# Patient Record
Sex: FEMALE | Race: WHITE | NOT HISPANIC OR LATINO | URBAN - METROPOLITAN AREA
[De-identification: names, ages, dates, MRNs, and addresses within clinical notes are randomized per-mention and may not be internally consistent; named-entity substitution may affect disease eponyms.]

---

## 2019-06-28 ENCOUNTER — OFFICE VISIT CONVERTED (OUTPATIENT)
Dept: ORTHOPEDIC SURGERY | Facility: CLINIC | Age: 24
End: 2019-06-28
Attending: ORTHOPAEDIC SURGERY

## 2019-07-30 ENCOUNTER — OFFICE VISIT CONVERTED (OUTPATIENT)
Dept: ORTHOPEDIC SURGERY | Facility: CLINIC | Age: 24
End: 2019-07-30
Attending: ORTHOPAEDIC SURGERY

## 2019-09-03 ENCOUNTER — OFFICE VISIT CONVERTED (OUTPATIENT)
Dept: ORTHOPEDIC SURGERY | Facility: CLINIC | Age: 24
End: 2019-09-03
Attending: ORTHOPAEDIC SURGERY

## 2019-09-17 ENCOUNTER — HOSPITAL ENCOUNTER (OUTPATIENT)
Dept: GENERAL RADIOLOGY | Facility: HOSPITAL | Age: 24
Discharge: HOME | End: 2019-09-17
Attending: ORTHOPAEDIC SURGERY

## 2019-10-17 ENCOUNTER — OFFICE VISIT CONVERTED (OUTPATIENT)
Dept: ORTHOPEDIC SURGERY | Facility: CLINIC | Age: 24
End: 2019-10-17
Attending: ORTHOPAEDIC SURGERY

## 2019-12-17 ENCOUNTER — CONVERSION ENCOUNTER (OUTPATIENT)
Dept: ORTHOPEDIC SURGERY | Facility: CLINIC | Age: 24
End: 2019-12-17

## 2019-12-17 ENCOUNTER — OFFICE VISIT CONVERTED (OUTPATIENT)
Dept: ORTHOPEDIC SURGERY | Facility: CLINIC | Age: 24
End: 2019-12-17
Attending: ORTHOPAEDIC SURGERY

## 2021-05-15 VITALS — HEART RATE: 110 BPM | OXYGEN SATURATION: 94 % | HEIGHT: 67 IN

## 2021-05-15 VITALS — BODY MASS INDEX: 47.09 KG/M2 | HEART RATE: 104 BPM | WEIGHT: 293 LBS | HEIGHT: 66 IN | OXYGEN SATURATION: 97 %

## 2021-05-15 VITALS — OXYGEN SATURATION: 98 % | HEIGHT: 67 IN | HEART RATE: 103 BPM

## 2021-05-15 VITALS — OXYGEN SATURATION: 97 % | HEART RATE: 130 BPM | HEIGHT: 67 IN

## 2021-05-15 VITALS — HEART RATE: 103 BPM | HEIGHT: 66 IN | OXYGEN SATURATION: 98 %

## 2021-12-15 ENCOUNTER — TRANSCRIBE ORDERS (OUTPATIENT)
Dept: ADMINISTRATIVE | Facility: HOSPITAL | Age: 26
End: 2021-12-15

## 2021-12-15 DIAGNOSIS — E03.9 HYPOTHYROIDISM, UNSPECIFIED TYPE: Primary | ICD-10-CM

## 2021-12-15 DIAGNOSIS — E28.2 POLYCYSTIC OVARIAN SYNDROME: ICD-10-CM

## 2022-01-11 ENCOUNTER — HOSPITAL ENCOUNTER (OUTPATIENT)
Dept: ULTRASOUND IMAGING | Facility: HOSPITAL | Age: 27
Discharge: HOME | End: 2022-01-11

## 2022-01-11 DIAGNOSIS — E03.9 HYPOTHYROIDISM, UNSPECIFIED TYPE: ICD-10-CM

## 2022-01-11 DIAGNOSIS — E28.2 POLYCYSTIC OVARIAN SYNDROME: ICD-10-CM

## 2022-01-11 PROCEDURE — 76536 US EXAM OF HEAD AND NECK: CPT

## 2022-01-11 PROCEDURE — 76830 TRANSVAGINAL US NON-OB: CPT

## 2024-02-02 PROBLEM — R07.2 CHEST PAIN, PRECORDIAL: Status: ACTIVE | Noted: 2024-02-02

## 2024-03-11 NOTE — PROGRESS NOTES
"Chief Complaint  Establish Care and Chest Pain      History of Present Illness  Alexandra Mark presents to [facility] CARDIOLOGY  Patient is a 28-year-old female with a past history of some borderline hypertension recently who has been having intermittent chest discomfort which is various locations on her chest can last for a few minutes associated with anxiety or stressful emotional situations.  Can occur weekly sharp in nature she is not having exertional chest pain.      History reviewed. No pertinent past medical history.    No current outpatient medications on file.    There are no discontinued medications.  No Known Allergies     Social History     Tobacco Use    Smoking status: Never    Smokeless tobacco: Never       History reviewed. No pertinent family history.     Objective     /63   Pulse 105   Ht 167.6 cm (66\")   Wt (!) 139 kg (307 lb)   BMI 49.55 kg/m²       Physical Exam    General Appearance:   no acute distress  Alert and oriented x3  HENT:   lips not cyanotic  Atraumatic  Neck:  No jvd   supple  Respiratory:  no respiratory distress  normal breath sounds  no rales  Cardiovascular:  Regular rate and rhythm  no S3, no S4   no murmur  no rub  Extremities  No cyanosis  lower extremity edema: none    Skin:   warm, dry  No rashes    Result Review :     No results found for: \"PROBNP\"  Thyroid stimulating hormone (TSH)  Specimen:  Plasma - Plasma specimen (specimen)  Component   Ref Range & Units 1 yr ago   TSH-TL   0.35 - 4.80 uIU/mL 2.34    Resulting Agency [facility]   Specimen Collected: 02/22/23 15:17    Performed by: [facility] LAB Last Resulted: 02/22/23 16:26   Received From: [facility]  Result Received: 02/19/24 13:11       Component   Ref Range & Units 1 yr ago Comments   GLUCOSE-TL   70 - 110 mg/dL 129 High      BUN-TL   7 - 18 mg/dL 16     CREATININE-TL   0.6 - 1.0 mg/dL 0.8     SODIUM-TL   136 - 145 mmol/L 139  " "   POTASSIUM-TL   3.5 - 5.1 mmol/L 4.1     Chloride   98 - 107 mmol/L 108 High      CARBON DIOXIDE-TL   21 - 32 mmol/L 22     CALCIUM-TL   8.7 - 10.2 mg/dL 8.8     ANION GAP-TL   4 - 12 mmol/L 13 High      GFR ESTIMATE-TL   mL/min 102      GFR   WITH KIDNEY DAMAGE     W/O DAMAGE   >=90         STAGE 1            NORMAL   60-89        STAGE 2            DEC GFR   30-59        STAGE 3            STAGE 3   15-29        STAGE 4            STAGE 4   <15          STAGE 5            STAGE 5       The formula is valid only for adults between age 18 and 70.   CALCULATED OSMO-TL   275 - 305 271 Low      Resulting [facility]         men:  Plasma - Plasma specimen (specimen)  Component   Ref Range & Units 11 mo ago   TSH-TL   0.35 - 4.80 uIU/mL 2.34    Resulting [facility]   Specimen Collected: 02/22/23 15:17       Component   Ref Range & Units 11 mo ago Comments   GLUCOSE-TL   70 - 110 mg/dL 129 High      BUN-TL   7 - 18 mg/dL 16     CREATININE-TL   0.6 - 1.0 mg/dL 0.8     SODIUM-TL   136 - 145 mmol/L 139     POTASSIUM-TL   3.5 - 5.1 mmol/L 4.1     Chloride   98 - 107 mmol/L 108 High      CARBON DIOXIDE-TL   21 - 32 mmol/L 22     CALCIUM-TL   8.7 - 10.2 mg/dL 8.8     ANION GAP-TL   4 - 12 mmol/L 13 High      GFR ESTIMATE-TL   mL/min 102               No results found for: \"TSH\"   No results found for: \"FREET4\"   No results found for: \"DDIMERQUANT\"  No results found for: \"MG\"   No results found for: \"DIGOXIN\"   No results found for: \"TROPONINT\"   No results found for: \"POCTROP\"(                No results found for this or any previous visit.             The ASCVD Risk score (Norma DK, et al., 2019) failed to calculate for the following reasons:    The 2019 ASCVD risk score is only valid for ages 40 to 79     Diagnoses and all orders for this visit:    1. Chest pain, atypical (Primary)  Assessment & Plan:  Patient with atypical chest pain issues and low cardiovascular risk score favor muscle " skeletal/anxiety etiology for her symptoms would not recommend further cardiovascular workup with stress test at this point did  patient on lifestyle changes with close monitoring of her blood pressure as it appears to be within the borderline hypertensive range right now.  Also did  patient on low-sodium diet, exercising, and a heart healthy diet.  Will go ahead and check a baseline lipid panel as well    Orders:  -     Lipid Panel; Future    Other orders  -     Cancel: Lipid Panel; Future            Follow Up     No follow-ups on file.          Patient was given instructions and counseling regarding her condition or for health maintenance advice. Please see specific information pulled into the AVS if appropriate.

## 2024-03-18 ENCOUNTER — OFFICE VISIT (OUTPATIENT)
Dept: CARDIOLOGY | Facility: CLINIC | Age: 29
End: 2024-03-18
Payer: COMMERCIAL

## 2024-03-18 VITALS
BODY MASS INDEX: 47.09 KG/M2 | HEIGHT: 66 IN | HEART RATE: 105 BPM | WEIGHT: 293 LBS | SYSTOLIC BLOOD PRESSURE: 144 MMHG | DIASTOLIC BLOOD PRESSURE: 63 MMHG

## 2024-03-18 DIAGNOSIS — R07.89 CHEST PAIN, ATYPICAL: Primary | ICD-10-CM

## 2024-03-18 PROCEDURE — 99203 OFFICE O/P NEW LOW 30 MIN: CPT | Performed by: INTERNAL MEDICINE

## 2024-03-18 NOTE — ASSESSMENT & PLAN NOTE
Patient with atypical chest pain issues and low cardiovascular risk score favor muscle skeletal/anxiety etiology for her symptoms would not recommend further cardiovascular workup with stress test at this point did  patient on lifestyle changes with close monitoring of her blood pressure as it appears to be within the borderline hypertensive range right now.  Also did  patient on low-sodium diet, exercising, and a heart healthy diet.  Will go ahead and check a baseline lipid panel as well

## 2024-11-26 ENCOUNTER — HOSPITAL ENCOUNTER (EMERGENCY)
Facility: HOSPITAL | Age: 29
Discharge: HOME | End: 2024-11-26
Attending: EMERGENCY MEDICINE | Admitting: EMERGENCY MEDICINE
Payer: COMMERCIAL

## 2024-11-26 ENCOUNTER — APPOINTMENT (OUTPATIENT)
Dept: GENERAL RADIOLOGY | Facility: HOSPITAL | Age: 29
End: 2024-11-26
Payer: COMMERCIAL

## 2024-11-26 VITALS
RESPIRATION RATE: 18 BRPM | SYSTOLIC BLOOD PRESSURE: 102 MMHG | TEMPERATURE: 98.3 F | BODY MASS INDEX: 47.09 KG/M2 | OXYGEN SATURATION: 98 % | WEIGHT: 293 LBS | HEART RATE: 93 BPM | DIASTOLIC BLOOD PRESSURE: 56 MMHG | HEIGHT: 66 IN

## 2024-11-26 DIAGNOSIS — R07.9 NONSPECIFIC CHEST PAIN: Primary | ICD-10-CM

## 2024-11-26 DIAGNOSIS — M94.0 COSTOCHONDRITIS: ICD-10-CM

## 2024-11-26 LAB
ALBUMIN SERPL-MCNC: 4.2 G/DL (ref 3.5–5.2)
ALBUMIN/GLOB SERPL: 1.2 G/DL
ALP SERPL-CCNC: 81 U/L (ref 39–117)
ALT SERPL W P-5'-P-CCNC: 32 U/L (ref 1–33)
ANION GAP SERPL CALCULATED.3IONS-SCNC: 7.8 MMOL/L (ref 5–15)
AST SERPL-CCNC: 20 U/L (ref 1–32)
BASOPHILS # BLD AUTO: 0.11 10*3/MM3 (ref 0–0.2)
BASOPHILS NFR BLD AUTO: 1.3 % (ref 0–1.5)
BILIRUB SERPL-MCNC: 0.5 MG/DL (ref 0–1.2)
BUN SERPL-MCNC: 15 MG/DL (ref 6–20)
BUN/CREAT SERPL: 16.9 (ref 7–25)
CALCIUM SPEC-SCNC: 9.3 MG/DL (ref 8.6–10.5)
CHLORIDE SERPL-SCNC: 105 MMOL/L (ref 98–107)
CO2 SERPL-SCNC: 25.2 MMOL/L (ref 22–29)
CREAT SERPL-MCNC: 0.89 MG/DL (ref 0.57–1)
DEPRECATED RDW RBC AUTO: 41.1 FL (ref 37–54)
EGFRCR SERPLBLD CKD-EPI 2021: 89.6 ML/MIN/1.73
EOSINOPHIL # BLD AUTO: 0.24 10*3/MM3 (ref 0–0.4)
EOSINOPHIL NFR BLD AUTO: 2.8 % (ref 0.3–6.2)
ERYTHROCYTE [DISTWIDTH] IN BLOOD BY AUTOMATED COUNT: 12.9 % (ref 12.3–15.4)
GLOBULIN UR ELPH-MCNC: 3.4 GM/DL
GLUCOSE SERPL-MCNC: 97 MG/DL (ref 65–99)
HCT VFR BLD AUTO: 42.9 % (ref 34–46.6)
HGB BLD-MCNC: 14.8 G/DL (ref 12–15.9)
HOLD SPECIMEN: NORMAL
HOLD SPECIMEN: NORMAL
IMM GRANULOCYTES # BLD AUTO: 0.06 10*3/MM3 (ref 0–0.05)
IMM GRANULOCYTES NFR BLD AUTO: 0.7 % (ref 0–0.5)
LIPASE SERPL-CCNC: 27 U/L (ref 13–60)
LYMPHOCYTES # BLD AUTO: 3.27 10*3/MM3 (ref 0.7–3.1)
LYMPHOCYTES NFR BLD AUTO: 37.7 % (ref 19.6–45.3)
MAGNESIUM SERPL-MCNC: 2.1 MG/DL (ref 1.6–2.6)
MCH RBC QN AUTO: 30.3 PG (ref 26.6–33)
MCHC RBC AUTO-ENTMCNC: 34.5 G/DL (ref 31.5–35.7)
MCV RBC AUTO: 87.9 FL (ref 79–97)
MONOCYTES # BLD AUTO: 0.55 10*3/MM3 (ref 0.1–0.9)
MONOCYTES NFR BLD AUTO: 6.3 % (ref 5–12)
NEUTROPHILS NFR BLD AUTO: 4.44 10*3/MM3 (ref 1.7–7)
NEUTROPHILS NFR BLD AUTO: 51.2 % (ref 42.7–76)
NRBC BLD AUTO-RTO: 0 /100 WBC (ref 0–0.2)
NT-PROBNP SERPL-MCNC: 53.5 PG/ML (ref 0–450)
PLATELET # BLD AUTO: 295 10*3/MM3 (ref 140–450)
PMV BLD AUTO: 10.4 FL (ref 6–12)
POTASSIUM SERPL-SCNC: 4.2 MMOL/L (ref 3.5–5.2)
PROT SERPL-MCNC: 7.6 G/DL (ref 6–8.5)
QT INTERVAL: 353 MS
QTC INTERVAL: 457 MS
RBC # BLD AUTO: 4.88 10*6/MM3 (ref 3.77–5.28)
SODIUM SERPL-SCNC: 138 MMOL/L (ref 136–145)
TROPONIN T SERPL HS-MCNC: <6 NG/L
WBC NRBC COR # BLD AUTO: 8.67 10*3/MM3 (ref 3.4–10.8)
WHOLE BLOOD HOLD COAG: NORMAL
WHOLE BLOOD HOLD SPECIMEN: NORMAL

## 2024-11-26 PROCEDURE — 84484 ASSAY OF TROPONIN QUANT: CPT | Performed by: EMERGENCY MEDICINE

## 2024-11-26 PROCEDURE — 93005 ELECTROCARDIOGRAM TRACING: CPT | Performed by: EMERGENCY MEDICINE

## 2024-11-26 PROCEDURE — 80053 COMPREHEN METABOLIC PANEL: CPT | Performed by: EMERGENCY MEDICINE

## 2024-11-26 PROCEDURE — 83690 ASSAY OF LIPASE: CPT | Performed by: EMERGENCY MEDICINE

## 2024-11-26 PROCEDURE — 83880 ASSAY OF NATRIURETIC PEPTIDE: CPT | Performed by: EMERGENCY MEDICINE

## 2024-11-26 PROCEDURE — 71045 X-RAY EXAM CHEST 1 VIEW: CPT

## 2024-11-26 PROCEDURE — 99284 EMERGENCY DEPT VISIT MOD MDM: CPT

## 2024-11-26 PROCEDURE — 85025 COMPLETE CBC W/AUTO DIFF WBC: CPT | Performed by: EMERGENCY MEDICINE

## 2024-11-26 PROCEDURE — 83735 ASSAY OF MAGNESIUM: CPT | Performed by: EMERGENCY MEDICINE

## 2024-11-26 RX ORDER — SODIUM CHLORIDE 0.9 % (FLUSH) 0.9 %
10 SYRINGE (ML) INJECTION AS NEEDED
Status: DISCONTINUED | OUTPATIENT
Start: 2024-11-26 | End: 2024-11-26 | Stop reason: HOSPADM

## 2024-11-26 RX ORDER — ASPIRIN 81 MG/1
324 TABLET, CHEWABLE ORAL ONCE
Status: DISCONTINUED | OUTPATIENT
Start: 2024-11-26 | End: 2024-11-26

## 2024-11-26 RX ORDER — HYDROXYZINE HYDROCHLORIDE 25 MG/1
25 TABLET, FILM COATED ORAL 3 TIMES DAILY PRN
COMMUNITY

## 2024-11-26 NOTE — ED PROVIDER NOTES
Time: 10:24 AM EST  Date of encounter:  11/26/2024  Independent Historian/Clinical History and Information was obtained by:   Patient and Family    History is limited by: N/A    Chief Complaint: Chest pain      History of Present Illness:  Patient is a 28 y.o. year old female who presents to the emergency department for evaluation of chest pain.  Patient presents emergency department today for evaluation of chest pain and shortness of breath.  She states yesterday she was having a bowel movement and had onset of lower abdominal pain and then had near syncopal episode.  She states he had been having midsternal chest pain since then.  She went to [facility] would like to emergency department yesterday and had workup and was sent home with Atarax and ibuprofen.  She states today she is having continued midsternal chest pain that is worse with movement and deep breathing.  She states yesterday when the near-syncopal episode occurred that she did not have any fall or injury to her chest.  Patient denies any personal history of cardiac issues but does admit to family history on her side.  Patient states that she has had some readings of elevated blood pressure but is not diagnosed with hypertension or on any medications for hypertension.  Patient states the pain does not radiate from the midsternal area.  She does states she has had 1 episode of numbness of the 3rd through 4th digits on her left hand.      Patient Care Team  Primary Care Provider: Sonia Hinojosa APRN    Past Medical History:     No Known Allergies  History reviewed. No pertinent past medical history.  History reviewed. No pertinent surgical history.  History reviewed. No pertinent family history.    Home Medications:  Prior to Admission medications    Medication Sig Start Date End Date Taking? Authorizing Provider   hydrOXYzine (ATARAX) 25 MG tablet Take 1 tablet by mouth 3 (Three) Times a Day As Needed for Itching.    Provider, MD Darinel  "       Social History:   Social History     Tobacco Use    Smoking status: Never    Smokeless tobacco: Never         Review of Systems:  Review of Systems   Respiratory:  Positive for shortness of breath.    Cardiovascular:  Positive for chest pain.   Neurological:  Positive for numbness.        Physical Exam:  /97 (BP Location: Left arm, Patient Position: Lying)   Pulse 101   Temp 98.3 °F (36.8 °C) (Oral)   Resp 17   Ht 167.6 cm (66\")   Wt (!) 144 kg (317 lb 0.3 oz)   LMP 11/11/2024 (Exact Date)   SpO2 98%   BMI 51.17 kg/m²     Physical Exam  Vitals and nursing note reviewed.   Constitutional:       General: She is not in acute distress.     Appearance: Normal appearance. She is well-developed. She is obese. She is not ill-appearing, toxic-appearing or diaphoretic.   HENT:      Head: Normocephalic and atraumatic.      Nose: Nose normal.   Eyes:      Extraocular Movements: Extraocular movements intact.      Conjunctiva/sclera: Conjunctivae normal.      Pupils: Pupils are equal, round, and reactive to light.   Cardiovascular:      Rate and Rhythm: Normal rate and regular rhythm.      Heart sounds: Normal heart sounds.   Pulmonary:      Effort: Pulmonary effort is normal.      Breath sounds: Normal breath sounds.   Chest:      Chest wall: Tenderness present.       Abdominal:      General: Abdomen is flat.      Palpations: Abdomen is soft. There is no mass.      Tenderness: There is no abdominal tenderness.   Musculoskeletal:         General: Normal range of motion.      Cervical back: Normal range of motion and neck supple.   Skin:     General: Skin is warm and dry.   Neurological:      General: No focal deficit present.      Mental Status: She is alert and oriented to person, place, and time.   Psychiatric:         Mood and Affect: Mood normal.         Behavior: Behavior normal.         Thought Content: Thought content normal.         Judgment: Judgment normal.          "       Procedures:  Procedures      Medical Decision Making:    Comorbidities that affect care:    None    External Notes reviewed:    Previous ED Note: Emergency department visit from yesterday where patient was seen for chest pain at [facility] emergency department      The following orders were placed and all results were independently analyzed by me:  Orders Placed This Encounter   Procedures    XR Chest 1 View    Allred Draw    High Sensitivity Troponin T    Comprehensive Metabolic Panel    Lipase    BNP    Magnesium    CBC Auto Differential    High Sensitivity Troponin T 2Hr    Ambulatory Referral to Cardiology    NPO Diet NPO Type: Strict NPO    Undress & Gown    Continuous Pulse Oximetry    Oxygen Therapy- Nasal Cannula; Titrate 1-6 LPM Per SpO2; 90 - 95%    ECG 12 Lead ED Triage Standing Order; Chest Pain    ECG 12 Lead ED Triage Standing Order; Chest Pain    Insert Peripheral IV    CBC & Differential    Green Top (Gel)    Lavender Top    Gold Top - SST    Light Blue Top       Medications Given in the Emergency Department:  Medications   sodium chloride 0.9 % flush 10 mL (has no administration in time range)        ED Course:         Labs:    Lab Results (last 24 hours)       Procedure Component Value Units Date/Time    CONV TROPONIN I [[id number]] Collected: 11/25/24 1441     Updated: 11/26/24 0957    PROBNP (REFERENCE) [[id number]] Collected: 11/25/24 1441     Updated: 11/26/24 0957    MAGNESIUM [[id number]] Collected: 11/25/24 1441     Updated: 11/26/24 0957    CK [[id number]] Collected: 11/25/24 1441     Updated: 11/26/24 0957    LIPASE [[id number]] Collected: 11/25/24 1441     Updated: 11/26/24 0957    PROTIME-INR [[id number]] Collected: 11/25/24 1441     Updated: 11/26/24 0957    COMPREHENSIVE METABOLIC PANEL [[id number]]  (Abnormal) Collected: 11/25/24 1441     Updated: 11/26/24 0957    CBC AND DIFFERENTIAL [[id number]]  (Abnormal) Collected: 11/25/24 1441     Updated: 11/26/24 0957     Urinalysis, Microscopic Only - [[id number]]  (Abnormal) Collected: 11/25/24 1543     Updated: 11/26/24 0957    Urinalysis With Culture If Indicated - [[id number]]  (Abnormal) Collected: 11/25/24 1543     Updated: 11/26/24 0957    Pregnancy, Urine - [[id number]] Collected: 11/25/24 1543     Updated: 11/26/24 0957    High Sensitivity Troponin T [[id number]]  (Normal) Collected: 11/26/24 1018    Specimen: Blood from Arm, Right Updated: 11/26/24 1054     HS Troponin T <6 ng/L     Narrative:      High Sensitive Troponin T Reference Range:  <14.0 ng/L- Negative Female for AMI  <22.0 ng/L- Negative Male for AMI  >=14 - Abnormal Female indicating possible myocardial injury.  >=22 - Abnormal Male indicating possible myocardial injury.   Clinicians would have to utilize clinical acumen, EKG, Troponin, and serial changes to determine if it is an Acute Myocardial Infarction or myocardial injury due to an underlying chronic condition.         CBC & Differential [[id number]]  (Abnormal) Collected: 11/26/24 1018    Specimen: Blood from Arm, Right Updated: 11/26/24 1034    Narrative:      The following orders were created for panel order CBC & Differential.  Procedure                               Abnormality         Status                     ---------                               -----------         ------                     CBC Auto Differential[[id number]]        Abnormal            Final result                 Please view results for these tests on the individual orders.    Comprehensive Metabolic Panel [[id number]] Collected: 11/26/24 1018    Specimen: Blood from Arm, Right Updated: 11/26/24 1050     Glucose 97 mg/dL      BUN 15 mg/dL      Creatinine 0.89 mg/dL      Sodium 138 mmol/L      Potassium 4.2 mmol/L      Chloride 105 mmol/L      CO2 25.2 mmol/L      Calcium 9.3 mg/dL      Total Protein 7.6 g/dL      Albumin 4.2 g/dL      ALT (SGPT) 32 U/L      AST (SGOT) 20 U/L      Alkaline Phosphatase 81 U/L      Total Bilirubin 0.5  mg/dL      Globulin 3.4 gm/dL      A/G Ratio 1.2 g/dL      BUN/Creatinine Ratio 16.9     Anion Gap 7.8 mmol/L      eGFR 89.6 mL/min/1.73     Narrative:      GFR Normal >60  Chronic Kidney Disease <60  Kidney Failure <15      Lipase [[id number]]  (Normal) Collected: 11/26/24 1018    Specimen: Blood from Arm, Right Updated: 11/26/24 1050     Lipase 27 U/L     BNP [[id number]]  (Normal) Collected: 11/26/24 1018    Specimen: Blood from Arm, Right Updated: 11/26/24 1054     proBNP 53.5 pg/mL     Narrative:      This assay is used as an aid in the diagnosis of individuals suspected of having heart failure. It can be used as an aid in the diagnosis of acute decompensated heart failure (ADHF) in patients presenting with signs and symptoms of ADHF to the emergency department (ED). In addition, NT-proBNP of <300 pg/mL indicates ADHF is not likely.    Age Range Result Interpretation  NT-proBNP Concentration (pg/mL:      <50             Positive            >450                   Gray                 300-450                    Negative             <300    50-75           Positive            >900                  Gray                300-900                  Negative            <300      >75             Positive            >1800                  Gray                300-1800                  Negative            <300    Magnesium [[id number]]  (Normal) Collected: 11/26/24 1018    Specimen: Blood from Arm, Right Updated: 11/26/24 1050     Magnesium 2.1 mg/dL     CBC Auto Differential [[id number]]  (Abnormal) Collected: 11/26/24 1018    Specimen: Blood from Arm, Right Updated: 11/26/24 1034     WBC 8.67 10*3/mm3      RBC 4.88 10*6/mm3      Hemoglobin 14.8 g/dL      Hematocrit 42.9 %      MCV 87.9 fL      MCH 30.3 pg      MCHC 34.5 g/dL      RDW 12.9 %      RDW-SD 41.1 fl      MPV 10.4 fL      Platelets 295 10*3/mm3      Neutrophil % 51.2 %      Lymphocyte % 37.7 %      Monocyte % 6.3 %      Eosinophil % 2.8 %      Basophil % 1.3 %       Immature Grans % 0.7 %      Neutrophils, Absolute 4.44 10*3/mm3      Lymphocytes, Absolute 3.27 10*3/mm3      Monocytes, Absolute 0.55 10*3/mm3      Eosinophils, Absolute 0.24 10*3/mm3      Basophils, Absolute 0.11 10*3/mm3      Immature Grans, Absolute 0.06 10*3/mm3      nRBC 0.0 /100 WBC              Imaging:    XR Chest 1 View    Result Date: 11/26/2024  XR CHEST 1 VW Date of Exam: 11/26/2024 10:15 AM EST Indication: Chest Pain Triage Protocol Comparison: Chest radiograph dated 4/16/2015 Findings: The cardiomediastinal silhouette is within normal limits. Pulmonary vascularity appears normal. There is no focal airspace consolidation, pleural effusion, or pneumothorax. There are degenerative changes of the thoracic spine.     Impression: 1. No acute cardiopulmonary abnormality. Electronically Signed: Denton Sadiq  11/26/2024 10:36 AM EST  Workstation ID: [id number]    XR Chest 2 View    Result Date: 11/25/2024  REPORT-ID:[id number] STUDY:   X-RAY CHEST REASON FOR EXAM:   Female, 28 years old.  CHEST PAIN; With deep breath or movement of arms.  Pt had near-syncopal episode today while having BM TECHNIQUE:   PA and lateral COMPARISON:   None. ___________________________________ FINDINGS: The lungs are clear and expanded.  There is no demonstrated pleural abnormality. Normal size heart.   Normal mediastinum and milton.  Normal visualized pulmonary arteries.  Normal visualized aortic arch and descending thoracic aorta. Normal visualized thoracic spine.  Normal visualized ribs, clavicles, and shoulders. There is no demonstrated abnormality of the visualized soft tissue structures of the upper abdomen. ___________________________________    Normal x-ray examination of the chest. Electronically Signed: Haider Coto MD 2024/11/25 at 15:33 CST Reading Location ID and State: [address] / [place] Tel [phone], Service support  [phone], Fax [phone]       Differential Diagnosis and  Discussion:    Chest Pain:  Based on the patient's signs and symptoms, I considered aortic dissection, myocardial infaction, pulmonary embolism, cardiac tamponade, pericarditis, pneumothorax, musculoskeletal chest pain and other differential diagnosis as an etiology of the patient's chest pain.     All labs were reviewed and interpreted by me.  All X-rays impressions were independently interpreted by me.  EKG was interpreted by me.  EKG was interpreted by supervising attending.    MDM  Number of Diagnoses or Management Options  Diagnosis management comments: Patient presented to emergency department today for evaluation of chest pain that is ongoing for 2 days.  Patient was seen at [place] emergency department yesterday and had negative workup but continued to have pain today so came here for evaluation.  CBC notes normal white blood cell count hemoglobin Bakkar.  CMP unremarkable.  Lipase normal.  Magnesium normal.  BNP normal.  Troponin negative.  EKG noted sinus rhythm.  Chest x-ray had no acute findings.  Heart score of 1.  Patient's pain is reproducible so I believe this is noncardiac chest pain like related to pleurisy or costochondritis.  I did discuss with patient that we will go ahead and refer her to cardiology for evaluation due to her family history.  Will have patient continue ibuprofen at home.       Amount and/or Complexity of Data Reviewed  Clinical lab tests: reviewed and ordered  Tests in the radiology section of CPT®: reviewed and ordered    Risk of Complications, Morbidity, and/or Mortality  Presenting problems: moderate  Diagnostic procedures: low  Management options: low    Patient Progress  Patient progress: stable       Patient Care Considerations:    CT CHEST: I considered ordering a CT scan of the chest, however patient is not hypoxic      Consultants/Shared Management Plan:        Social Determinants of Health:    Patient is independent, reliable, and has access to care.        Disposition and Care Coordination:    Discharged: The patient is suitable and stable for discharge with no need for consideration of admission.    I have explained the patient´s condition, diagnoses and treatment plan based on the information available to me at this time. I have answered questions and addressed any concerns. The patient has a good  understanding of the patient´s diagnosis, condition, and treatment plan as can be expected at this point. The vital signs have been stable. The patient´s condition is stable and appropriate for discharge from the emergency department.      The patient will pursue further outpatient evaluation with the primary care physician or other designated or consulting physician as outlined in the discharge instructions. They are agreeable to this plan of care and follow-up instructions have been explained in detail. The patient has received these instructions in written format and has expressed an understanding of the discharge instructions. The patient is aware that any significant change in condition or worsening of symptoms should prompt an immediate return to this or the closest emergency department or call to 911.  I have explained discharge medications and the need for follow up with the patient/caretakers. This was also printed in the discharge instructions. Patient was discharged with the following medications and follow up:      Medication List      No changes were made to your prescriptions during this visit.      Sonia Hinojosa, APRN  [address] [facility] [address]  [phone]             Final diagnoses:   Nonspecific chest pain   Costochondritis        ED Disposition       ED Disposition   Discharge    Condition   Stable    Comment   --               This medical record created using voice recognition software.             Santiago Iraheta PA-C  11/26/24 2590

## 2024-11-26 NOTE — DISCHARGE INSTRUCTIONS
Your labs and EKG completed in the emergency department today look well.  I sent referral to cardiology and they should contact you for follow-up appointment.  I believe your symptoms are related to costochondritis.  Continue taking ibuprofen as directed.  Return to the emergency department for new or worsening symptoms.

## 2024-11-26 NOTE — Clinical Note
[facility] EMERGENCY ROOM  [address] [place] [address]  Phone: [phone]  Fax: [phone]    Zachery accompanied Alexandra Mark to the emergency department on 11/26/2024. They may return to work on 11/27/2024.        Thank you for choosing [facility].    Magdalena Lewis RN

## 2024-11-26 NOTE — ED TRIAGE NOTES
Patient to ED from home with SOB, Chest Pain that started yesterday around 0700. She was seen at [place] in the ED yesterday but cleared to go home.   Patient states yesterday, she was having lower abdominal pain, that subsided. She then began having mid sternal chest pain that does not radiate. Patient also reports left hand numbness that has subsided since arrival.